# Patient Record
Sex: FEMALE | Race: OTHER | NOT HISPANIC OR LATINO | Employment: FULL TIME | URBAN - METROPOLITAN AREA
[De-identification: names, ages, dates, MRNs, and addresses within clinical notes are randomized per-mention and may not be internally consistent; named-entity substitution may affect disease eponyms.]

---

## 2022-10-28 ENCOUNTER — TELEMEDICINE (OUTPATIENT)
Dept: FAMILY MEDICINE CLINIC | Facility: TELEHEALTH | Age: 42
End: 2022-10-28

## 2022-10-28 DIAGNOSIS — K29.00 ACUTE GASTRITIS WITHOUT HEMORRHAGE, UNSPECIFIED GASTRITIS TYPE: Primary | ICD-10-CM

## 2022-10-28 PROCEDURE — BRIGHTMDVISIT: Performed by: NURSE PRACTITIONER

## 2022-10-28 RX ORDER — SUCRALFATE ORAL 1 G/10ML
1 SUSPENSION ORAL
Qty: 400 ML | Refills: 0 | OUTPATIENT
Start: 2022-10-28 | End: 2022-11-02

## 2022-10-28 RX ORDER — FAMOTIDINE 20 MG/1
20 TABLET, FILM COATED ORAL 2 TIMES DAILY PRN
Qty: 60 TABLET | Refills: 0 | Status: SHIPPED | OUTPATIENT
Start: 2022-10-28 | End: 2022-11-22

## 2022-10-28 NOTE — PROGRESS NOTES
Chief Complaint   Patient presents with   • Nausea       Subjective   Linnea Henry is a 42 y.o. female.     Nausea  The current episode started 1 to 4 weeks ago. The problem occurs intermittently. Associated symptoms include a change in bowel habit and nausea. Pertinent negatives include no abdominal pain, arthralgias, chills, congestion, diaphoresis, fatigue, fever, headaches, myalgias, rash or vomiting. Associated symptoms comments: Bloating, abdominal pressure, gas. The symptoms are aggravated by drinking and eating. She has tried acetaminophen and NSAIDs for the symptoms. The treatment provided no relief.       The following portions of the patient's history were reviewed and updated as appropriate: allergies, current medications, past medical history, and problem list.      Past Medical History:   Diagnosis Date   • Irritable bowel syndrome      Social History     Socioeconomic History   • Marital status:      medication documentation: reviewed and updated with patient and   Current Outpatient Medications:   •  famotidine (Pepcid) 20 MG tablet, Take 1 tablet by mouth 2 (Two) Times a Day As Needed for Indigestion or Heartburn for up to 30 days., Disp: 60 tablet, Rfl: 0  •  sucralfate (Carafate) 1 GM/10ML suspension, Take 10 mL by mouth 4 (Four) Times a Day With Meals & at Bedtime for 10 days., Disp: 400 mL, Rfl: 0  Review of Systems   Constitutional: Negative for appetite change, chills, diaphoresis, fatigue and fever.   HENT: Negative for congestion.    Gastrointestinal: Positive for change in bowel habit and nausea. Negative for abdominal pain, blood in stool, constipation, diarrhea and vomiting.   Endocrine: Negative for polydipsia and polyuria.   Genitourinary: Negative for decreased urine volume, difficulty urinating, dysuria, flank pain, hematuria, menstrual problem, pelvic pain and urgency.   Musculoskeletal: Positive for back pain. Negative for arthralgias and myalgias.   Skin: Negative for  color change, pallor and rash.   Neurological: Negative for dizziness and headaches.       Objective   Physical Exam  Constitutional:       General: She is not in acute distress.     Appearance: She is not ill-appearing or diaphoretic.   HENT:      Nose: Nose normal.      Mouth/Throat:      Pharynx: Oropharynx is clear.   Eyes:      Conjunctiva/sclera: Conjunctivae normal.   Pulmonary:      Effort: Pulmonary effort is normal.   Abdominal:      General: There is no distension.      Tenderness: There is no abdominal tenderness.      Comments: Patient guided exam: reports tenderness in epigastric region   Neurological:      Mental Status: She is alert.         Assessment & Plan   Diagnoses and all orders for this visit:    1. Acute gastritis without hemorrhage, unspecified gastritis type (Primary)  -     sucralfate (Carafate) 1 GM/10ML suspension; Take 10 mL by mouth 4 (Four) Times a Day With Meals & at Bedtime for 10 days.  Dispense: 400 mL; Refill: 0  -     famotidine (Pepcid) 20 MG tablet; Take 1 tablet by mouth 2 (Two) Times a Day As Needed for Indigestion or Heartburn for up to 30 days.  Dispense: 60 tablet; Refill: 0                    Follow Up:  If your symptoms are not resolving by the completion of your treatment or are worsening, see your primary care provider for follow up. If you don't have a primary care provider, you may go to any Urgent Care for re-evaluation. If you develop any life threatening symptoms, go to the nearest Emergency Department immediately or call EMS.               The use of  Video Visit was utilized during this visit, using both MyChart and Zoom. The use of a video visit has been reviewed with the patient and verbal informed consent has been obtained. No technical difficulties occurred during the visit.    is located at [address] [place] [id number]  Provider is located at [place], [place]

## 2022-11-22 ENCOUNTER — LAB (OUTPATIENT)
Dept: LAB | Facility: HOSPITAL | Age: 42
End: 2022-11-22

## 2022-11-22 ENCOUNTER — OFFICE VISIT (OUTPATIENT)
Dept: FAMILY MEDICINE CLINIC | Facility: CLINIC | Age: 42
End: 2022-11-22

## 2022-11-22 VITALS
SYSTOLIC BLOOD PRESSURE: 110 MMHG | HEART RATE: 75 BPM | RESPIRATION RATE: 16 BRPM | TEMPERATURE: 98 F | BODY MASS INDEX: 22.49 KG/M2 | DIASTOLIC BLOOD PRESSURE: 70 MMHG | HEIGHT: 65 IN | OXYGEN SATURATION: 100 % | WEIGHT: 135 LBS

## 2022-11-22 DIAGNOSIS — Z92.89 HISTORY OF ENDOMETRIAL BIOPSY: ICD-10-CM

## 2022-11-22 DIAGNOSIS — E55.9 VITAMIN D DEFICIENCY: ICD-10-CM

## 2022-11-22 DIAGNOSIS — R53.83 OTHER FATIGUE: ICD-10-CM

## 2022-11-22 DIAGNOSIS — Z13.220 SCREENING CHOLESTEROL LEVEL: ICD-10-CM

## 2022-11-22 DIAGNOSIS — N64.4 BREAST PAIN: ICD-10-CM

## 2022-11-22 DIAGNOSIS — R07.81 RIB PAIN: Primary | ICD-10-CM

## 2022-11-22 PROBLEM — K58.9 IRRITABLE BOWEL SYNDROME: Status: ACTIVE | Noted: 2022-11-22

## 2022-11-22 PROBLEM — G47.09 OTHER INSOMNIA: Status: RESOLVED | Noted: 2022-11-22 | Resolved: 2022-11-22

## 2022-11-22 PROBLEM — G47.09 OTHER INSOMNIA: Status: ACTIVE | Noted: 2022-11-22

## 2022-11-22 PROBLEM — H90.6 MIXED CONDUCTIVE AND SENSORINEURAL HEARING LOSS OF BOTH EARS: Status: ACTIVE | Noted: 2022-11-22

## 2022-11-22 LAB
25(OH)D3 SERPL-MCNC: 20.4 NG/ML (ref 30–100)
ALBUMIN SERPL-MCNC: 4.2 G/DL (ref 3.5–5.2)
ALBUMIN/GLOB SERPL: 2.1 G/DL
ALP SERPL-CCNC: 49 U/L (ref 39–117)
ALT SERPL W P-5'-P-CCNC: 14 U/L (ref 1–33)
ANION GAP SERPL CALCULATED.3IONS-SCNC: 13.5 MMOL/L (ref 5–15)
AST SERPL-CCNC: 15 U/L (ref 1–32)
BASOPHILS # BLD AUTO: 0.07 10*3/MM3 (ref 0–0.2)
BASOPHILS NFR BLD AUTO: 0.8 % (ref 0–1.5)
BILIRUB SERPL-MCNC: 0.6 MG/DL (ref 0–1.2)
BUN SERPL-MCNC: 7 MG/DL (ref 6–20)
BUN/CREAT SERPL: 10 (ref 7–25)
CALCIUM SPEC-SCNC: 9.4 MG/DL (ref 8.6–10.5)
CHLORIDE SERPL-SCNC: 102 MMOL/L (ref 98–107)
CHOLEST SERPL-MCNC: 212 MG/DL (ref 0–200)
CO2 SERPL-SCNC: 25.5 MMOL/L (ref 22–29)
CREAT SERPL-MCNC: 0.7 MG/DL (ref 0.57–1)
DEPRECATED RDW RBC AUTO: 43.8 FL (ref 37–54)
EGFRCR SERPLBLD CKD-EPI 2021: 110.9 ML/MIN/1.73
EOSINOPHIL # BLD AUTO: 0.12 10*3/MM3 (ref 0–0.4)
EOSINOPHIL NFR BLD AUTO: 1.4 % (ref 0.3–6.2)
ERYTHROCYTE [DISTWIDTH] IN BLOOD BY AUTOMATED COUNT: 13.3 % (ref 12.3–15.4)
GLOBULIN UR ELPH-MCNC: 2 GM/DL
GLUCOSE SERPL-MCNC: 80 MG/DL (ref 65–99)
HCT VFR BLD AUTO: 46.5 % (ref 34–46.6)
HDLC SERPL-MCNC: 74 MG/DL (ref 40–60)
HGB BLD-MCNC: 14.8 G/DL (ref 12–15.9)
IMM GRANULOCYTES # BLD AUTO: 0.04 10*3/MM3 (ref 0–0.05)
IMM GRANULOCYTES NFR BLD AUTO: 0.5 % (ref 0–0.5)
LDLC SERPL CALC-MCNC: 124 MG/DL (ref 0–100)
LDLC/HDLC SERPL: 1.66 {RATIO}
LYMPHOCYTES # BLD AUTO: 1.58 10*3/MM3 (ref 0.7–3.1)
LYMPHOCYTES NFR BLD AUTO: 18.4 % (ref 19.6–45.3)
MCH RBC QN AUTO: 28.5 PG (ref 26.6–33)
MCHC RBC AUTO-ENTMCNC: 31.8 G/DL (ref 31.5–35.7)
MCV RBC AUTO: 89.4 FL (ref 79–97)
MONOCYTES # BLD AUTO: 0.39 10*3/MM3 (ref 0.1–0.9)
MONOCYTES NFR BLD AUTO: 4.5 % (ref 5–12)
NEUTROPHILS NFR BLD AUTO: 6.4 10*3/MM3 (ref 1.7–7)
NEUTROPHILS NFR BLD AUTO: 74.4 % (ref 42.7–76)
NRBC BLD AUTO-RTO: 0 /100 WBC (ref 0–0.2)
PLATELET # BLD AUTO: 266 10*3/MM3 (ref 140–450)
PMV BLD AUTO: 10.4 FL (ref 6–12)
POTASSIUM SERPL-SCNC: 4.4 MMOL/L (ref 3.5–5.2)
PROT SERPL-MCNC: 6.2 G/DL (ref 6–8.5)
RBC # BLD AUTO: 5.2 10*6/MM3 (ref 3.77–5.28)
SODIUM SERPL-SCNC: 141 MMOL/L (ref 136–145)
TRIGL SERPL-MCNC: 77 MG/DL (ref 0–150)
TSH SERPL DL<=0.05 MIU/L-ACNC: 1.72 UIU/ML (ref 0.27–4.2)
VLDLC SERPL-MCNC: 14 MG/DL (ref 5–40)
WBC NRBC COR # BLD: 8.6 10*3/MM3 (ref 3.4–10.8)

## 2022-11-22 PROCEDURE — 90471 IMMUNIZATION ADMIN: CPT | Performed by: STUDENT IN AN ORGANIZED HEALTH CARE EDUCATION/TRAINING PROGRAM

## 2022-11-22 PROCEDURE — 85025 COMPLETE CBC W/AUTO DIFF WBC: CPT

## 2022-11-22 PROCEDURE — 82306 VITAMIN D 25 HYDROXY: CPT

## 2022-11-22 PROCEDURE — 80061 LIPID PANEL: CPT

## 2022-11-22 PROCEDURE — 90686 IIV4 VACC NO PRSV 0.5 ML IM: CPT | Performed by: STUDENT IN AN ORGANIZED HEALTH CARE EDUCATION/TRAINING PROGRAM

## 2022-11-22 PROCEDURE — 80053 COMPREHEN METABOLIC PANEL: CPT

## 2022-11-22 PROCEDURE — 84443 ASSAY THYROID STIM HORMONE: CPT

## 2022-11-22 PROCEDURE — 99204 OFFICE O/P NEW MOD 45 MIN: CPT | Performed by: STUDENT IN AN ORGANIZED HEALTH CARE EDUCATION/TRAINING PROGRAM

## 2022-11-22 RX ORDER — NAPROXEN 500 MG/1
500 TABLET ORAL 2 TIMES DAILY WITH MEALS
Qty: 20 TABLET | Refills: 0 | Status: SHIPPED | OUTPATIENT
Start: 2022-11-22 | End: 2022-11-29

## 2022-11-22 RX ORDER — METHOCARBAMOL 750 MG/1
750 TABLET, FILM COATED ORAL 3 TIMES DAILY PRN
Qty: 30 TABLET | Refills: 1 | Status: SHIPPED | OUTPATIENT
Start: 2022-11-22 | End: 2022-11-29

## 2022-11-22 NOTE — PATIENT INSTRUCTIONS
Naproxen  Robaxin for muscle relaxor     [email or web address]    [facility] lab   [address], [place] [address] [phone]    [facility] lab   [address] [place] [id number] [phone]

## 2022-11-22 NOTE — PROGRESS NOTES
New Patient Office Visit      Subjective      Chief Complaint:  Headache, Fatigue, and Breast Pain    History of Present Illness: Linnea Durant is a 42 y.o. female who presents for a new patient visit.     CBC, CMP and TSH within normal as 2019.  Vitamin D a little bit low in 2019.     Patient with breast pain for 1 week. It is bilateral. Improved with sports bra. No changes to the breast. No family history breast cancer. No new palpable changes to breast. No vaginal bleeding and s/p hysterectomy.     Stated getting spasm to middle of back 2 days ago. This is still bothersome. No injury.     Patient does have some fatigue as well and trouble sleeping due to stress    Past Medical History:   Diagnosis Date   • Irritable bowel syndrome        Past Surgical History:   Procedure Laterality Date   • ADENOIDECTOMY     • APPENDECTOMY     • HYSTERECTOMY      endometriosis, and precancerous cells in uterus.       Family History   Problem Relation Age of Onset   • Hypertension Mother    • Anxiety disorder Mother    • Depression Mother    • Diabetes Mother        Social History     Socioeconomic History   • Marital status:    Tobacco Use   • Smoking status: Former     Packs/day: 0.50     Years: 30.00     Pack years: 15.00     Types: Cigarettes     Start date:      Quit date: 2017     Years since quittin.8     Passive exposure: Past   • Smokeless tobacco: Never   Vaping Use   • Vaping Use: Never used   Substance and Sexual Activity   • Alcohol use: Yes     Comment: 1 per week   • Drug use: Never   • Sexual activity: Yes     Partners: Male         Current Outpatient Medications:   •  methocarbamol (ROBAXIN) 750 MG tablet, Take 1 tablet by mouth 3 (Three) Times a Day As Needed for Muscle Spasms., Disp: 30 tablet, Rfl: 1  •  naproxen (Naprosyn) 500 MG tablet, Take 1 tablet by mouth 2 (Two) Times a Day With Meals for 10 days., Disp: 20 tablet, Rfl: 0    Allergies   Allergen Reactions   • Morphine Hives  "      Objective     Physical Exam:  Vital Signs:  /70   Pulse 75   Temp 98 °F (36.7 °C)   Resp 16   Ht 165.1 cm (65\")   Wt 61.2 kg (135 lb)   SpO2 100%   BMI 22.47 kg/m²      Physical Exam  Constitutional:       General: She is not in acute distress.     Appearance: She is not ill-appearing.   Eyes:      Extraocular Movements: Extraocular movements intact.   Neck:      Thyroid: No thyromegaly.   Cardiovascular:      Rate and Rhythm: Normal rate and regular rhythm.      Heart sounds: No murmur heard.   Pulmonary:      Effort: Pulmonary effort is normal.      Breath sounds: Normal breath sounds.   Abdominal:      Palpations: Abdomen is soft.   Lymphadenopathy:      Cervical: No cervical adenopathy.   Skin:     General: Skin is warm and dry.   Neurological:      Mental Status: She is alert.   Psychiatric:         Thought Content: Thought content normal.   Breast: Left: no rash.  No changes to nipple areola.  No mass palpated.  No axillary adenopathy tenderness throughout the breast.  Tenderness underneath the breast to the palpation of ribs  Right: no rash.  No changes to nipple areola.  No mass palpated.  No axillary adenopathy    Patient has tenderness to the right thoracic paraspinal and along the right ribs.  Tenderness progresses all the way to right anterior ribs to costochondral junction           Assessment / Plan      Assessment/Plan:   Diagnoses and all orders for this visit:    1. Rib pain (Primary)  -     naproxen (Naprosyn) 500 MG tablet; Take 1 tablet by mouth 2 (Two) Times a Day With Meals for 10 days.  Dispense: 20 tablet; Refill: 0  -     methocarbamol (ROBAXIN) 750 MG tablet; Take 1 tablet by mouth 3 (Three) Times a Day As Needed for Muscle Spasms.  Dispense: 30 tablet; Refill: 1  -     Rib/breast pain seems most likely to be musculoskeletal primarily in nature and was given stretches and exercises for rib dysfunction and treat with naproxen and Robaxin.  Follow-up if not improving.    2. " Breast pain  -     Cancel: Ambulatory Referral to Gynecology  -     Ambulatory Referral to Gynecology  -     While most of this pain seems related to musculoskeletal origin she does have a little bit of tenderness to the breast bilaterally.  No imaging needed.  She needs to establish with GYN and can further address at that appointment if not improved.  I recommended supportive bra and NSAIDs.    3. History of endometrial biopsy  -     Cancel: Ambulatory Referral to Gynecology  -     Ambulatory Referral to Gynecology  -     History of precancerous cells on endometrial biopsy and had hysterectomy for endometriosis and abnormal endometrial biopsy.  Had this done in [place] but not sure where I am asked her to try to find these records.    4. Other fatigue  -     Comprehensive Metabolic Panel  -     CBC & Differential  -     TSH Rfx On Abnormal To Free T4  -     Likely due to stress and poor sleep.  Check labs to rule out alternative causes    5. Vitamin D deficiency  -     Vitamin D,25-Hydroxy    6. Screening cholesterol level  -     Lipid Panel    Other orders  -     FluLaval/Fluzone >6 mos ([id number])    Follow Up:   No follow-ups on file.    MDM: Fatigue is new problem uncertain prognosis to examiner requiring further evaluation labs.  Prescription drug management    Raoul Mccabe MD  [place] Medicine - [facility] [place]

## 2022-11-29 ENCOUNTER — OFFICE VISIT (OUTPATIENT)
Dept: OBSTETRICS AND GYNECOLOGY | Facility: CLINIC | Age: 42
End: 2022-11-29

## 2022-11-29 VITALS
RESPIRATION RATE: 16 BRPM | SYSTOLIC BLOOD PRESSURE: 116 MMHG | WEIGHT: 136 LBS | BODY MASS INDEX: 22.63 KG/M2 | DIASTOLIC BLOOD PRESSURE: 70 MMHG

## 2022-11-29 DIAGNOSIS — N60.12 FIBROCYSTIC BREAST CHANGES, BILATERAL: ICD-10-CM

## 2022-11-29 DIAGNOSIS — N60.11 FIBROCYSTIC BREAST CHANGES, BILATERAL: ICD-10-CM

## 2022-11-29 DIAGNOSIS — E55.9 VITAMIN D DEFICIENCY: Primary | ICD-10-CM

## 2022-11-29 DIAGNOSIS — Z80.41 FAMILY HISTORY OF OVARIAN CANCER: ICD-10-CM

## 2022-11-29 DIAGNOSIS — Z01.419 ENCOUNTER FOR GYNECOLOGICAL EXAMINATION WITHOUT ABNORMAL FINDING: Primary | ICD-10-CM

## 2022-11-29 DIAGNOSIS — N64.4 PAIN OF BOTH BREASTS: ICD-10-CM

## 2022-11-29 PROCEDURE — 3008F BODY MASS INDEX DOCD: CPT | Performed by: NURSE PRACTITIONER

## 2022-11-29 PROCEDURE — 2014F MENTAL STATUS ASSESS: CPT | Performed by: NURSE PRACTITIONER

## 2022-11-29 PROCEDURE — 99396 PREV VISIT EST AGE 40-64: CPT | Performed by: NURSE PRACTITIONER

## 2022-11-29 RX ORDER — ERGOCALCIFEROL 1.25 MG/1
50000 CAPSULE ORAL WEEKLY
Qty: 13 CAPSULE | Refills: 3 | Status: SHIPPED | OUTPATIENT
Start: 2022-11-29 | End: 2022-11-29

## 2022-11-29 NOTE — PROGRESS NOTES
Annual Visit     Patient Name: Linnea Durant  : 1980   MRN: [id number]   Care Team: Patient Care Team:  Raoul Mccabe MD as PCP - General (Family Medicine)  Opal Lundberg APRN as Nurse Practitioner (Nurse Practitioner)    Chief Complaint:    Chief Complaint   Patient presents with   • Annual Exam   • Breast Pain       HPI: Linnea Durant is a 42 y.o. year old  presenting to be seen for her gynecologic exam - new pt.   S/p total hyst with RSO d/t endometriosis   Left ovary in situ     C/o bilateral breast pain x 1 wk now, but worse in right breast   Wearing a sports bra is helpful   Hasn't had breast imaging yet   Drinks minimal amt of caffeine   No changes on SBEs - no masses noted, no nipple d/c, or skin changes   No family hx of breast cancer     Family hx of ovarian cancer - MGM   Has never had genetic testing       Subjective      I have reviewed the patients family history, social history, past medical history, past surgical history and have updated it as appropriate.    /70   Resp 16   Wt 61.7 kg (136 lb)   BMI 22.63 kg/m²     BMI reviewed: Body mass index is 22.63 kg/m².      Objective     Physical Exam    Neuro: alert and oriented to person, place and time   General:  alert; cooperative; well developed; well nourished   Skin:  No suspicious lesions seen   Thyroid: normal to inspection and palpation   Lungs:  breathing is unlabored  clear to auscultation bilaterally   Heart:  regular rate and rhythm, S1, S2 normal, no murmur, click, rub or gallop  normal apical impulse   Breasts:  Examined in supine position  Symmetric without masses or skin dimpling  Nipples normal without inversion, lesions or discharge  There are no palpable axillary nodes  Fibrocystic changes are present both breasts without a discrete mass   Abdomen: soft, non-tender; no masses  no umbilical or inguinal hernias are present  no hepato-splenomegaly   Pelvis: Clinical staff was present for  exam  External genitalia:  normal appearance of the external genitalia including Bartholin's and Bunker's glands.  :  urethral meatus normal;  Vaginal:  normal pink mucosa without prolapse or lesions.  Cervix:  absent.  Uterus:  absent.  Adnexa:  LEFT adnexa normal; RIGHT adnexa absent  Rectal:  digital rectal exam not performed; anus visually normal appearing.         Assessment / Plan      Assessment  Problems Addressed This Visit    ICD-10-CM ICD-9-CM   1. Encounter for gynecological examination without abnormal finding  Z01.419 V72.31   2. Fibrocystic breast changes, bilateral  N60.11 610.1    N60.12    3. Pain of both breasts  N64.4 611.71   4. Family history of ovarian cancer  Z80.41 V16.41       Plan    Discussed cessation of pap smear screening after hysterectomy done for benign reasons     Discussed monthly SBEs and importance of annual breast imaging   Discussed fibrocystic breast changes and breast pain   Recommend vit e or evening primrose oil and NSAIDs or tylenol prn   Cont wearing supportive bra   Bilateral dx mammogram ordered - will call to discuss results and recommended f/u     Discussed ovarian screening program at  - contact info given   Discussed genetic testing/counseling - she declines today     AV 1 yr         40 to 64: Counseling/Anticipatory Guidance Discussed: screenings and self-breast exam    Follow Up  Return in about 1 year (around 11/29/2023) for Annual physical.  Patient was given instructions and counseling regarding her condition or for health maintenance advice. Please see specific information pulled into the AVS if appropriate.     Opal Lundberg, APRN  November 29, 2022  11:30 EST

## 2023-03-06 ENCOUNTER — HOSPITAL ENCOUNTER (OUTPATIENT)
Dept: MAMMOGRAPHY | Facility: HOSPITAL | Age: 43
Discharge: HOME | End: 2023-03-06
Admitting: NURSE PRACTITIONER
Payer: MEDICAID

## 2023-03-06 DIAGNOSIS — N64.4 PAIN OF BOTH BREASTS: ICD-10-CM

## 2023-03-06 PROCEDURE — 77066 DX MAMMO INCL CAD BI: CPT

## 2023-03-06 PROCEDURE — G0279 TOMOSYNTHESIS, MAMMO: HCPCS

## 2023-03-06 PROCEDURE — 77066 DX MAMMO INCL CAD BI: CPT | Performed by: RADIOLOGY

## 2023-03-06 PROCEDURE — 77062 BREAST TOMOSYNTHESIS BI: CPT | Performed by: RADIOLOGY

## 2023-03-09 DIAGNOSIS — Z80.3 FAMILY HISTORY OF BREAST CANCER: Primary | ICD-10-CM

## 2023-03-09 DIAGNOSIS — Z80.41 FAMILY HISTORY OF OVARIAN CANCER: ICD-10-CM

## 2023-04-03 ENCOUNTER — OFFICE VISIT (OUTPATIENT)
Dept: FAMILY MEDICINE CLINIC | Facility: CLINIC | Age: 43
End: 2023-04-03
Payer: MEDICAID

## 2023-04-03 VITALS
OXYGEN SATURATION: 100 % | HEART RATE: 78 BPM | SYSTOLIC BLOOD PRESSURE: 134 MMHG | WEIGHT: 135 LBS | TEMPERATURE: 98.2 F | DIASTOLIC BLOOD PRESSURE: 84 MMHG | RESPIRATION RATE: 18 BRPM | HEIGHT: 65 IN | BODY MASS INDEX: 22.49 KG/M2

## 2023-04-03 DIAGNOSIS — L30.9 HAND ECZEMA: ICD-10-CM

## 2023-04-03 DIAGNOSIS — G56.21 ULNAR NEUROPATHY OF RIGHT UPPER EXTREMITY: Primary | ICD-10-CM

## 2023-04-03 RX ORDER — MOMETASONE FUROATE 1 MG/G
1 OINTMENT TOPICAL DAILY
Qty: 45 G | Refills: 0 | Status: SHIPPED | OUTPATIENT
Start: 2023-04-03

## 2023-04-03 RX ORDER — MULTIVITAMIN
TABLET ORAL
COMMUNITY
Start: 2023-01-20

## 2023-04-03 RX ORDER — ERGOCALCIFEROL 1.25 MG/1
CAPSULE ORAL
COMMUNITY
Start: 2023-03-14

## 2023-04-03 RX ORDER — METHOCARBAMOL 750 MG/1
TABLET, FILM COATED ORAL
COMMUNITY
Start: 2022-12-27

## 2023-04-03 RX ORDER — ACETAMINOPHEN 500 MG
500 TABLET ORAL EVERY 6 HOURS PRN
COMMUNITY

## 2023-04-03 NOTE — PROGRESS NOTES
"  Established Patient Office Visit        Subjective      Chief Complaint:  Numbness (Numbness in left hand, peeling and cracking of skin on both hands, does want tdap today)      History of Present Illness: Linnea Durant is a 42 y.o. female who presents for hand dryness and numbness     Worsening dryness and cracking to the fingers.  She works as a  at [employer].    numbess to the left 4th and 5th fingers. Started 2 weeks ago.      Patient Active Problem List   Diagnosis   • Mixed conductive and sensorineural hearing loss of both ears   • Irritable bowel syndrome   • Vitamin D deficiency   • History of endometrial biopsy         Current Outpatient Medications:   •  acetaminophen (TYLENOL) 500 MG tablet, Take 1 tablet by mouth Every 6 (Six) Hours As Needed for Mild Pain. Patient takes two 500 mg tablets otc as needed for migraines, Disp: , Rfl:   •  methocarbamol (ROBAXIN) 750 MG tablet, , Disp: , Rfl:   •  vitamin D (ERGOCALCIFEROL) 1.25 MG (41909 UT) capsule capsule, , Disp: , Rfl:   •  mometasone (ELOCON) 0.1 % ointment, Apply 1 application topically to the appropriate area as directed Daily., Disp: 45 g, Rfl: 0  •  One-Daily Multi-Vitamin tablet tablet, , Disp: , Rfl:        Objective     Physical Exam:   Vital Signs:   /84 (BP Location: Left arm, Patient Position: Sitting, Cuff Size: Adult)   Pulse 78   Temp 98.2 °F (36.8 °C) (Temporal)   Resp 18   Ht 165.1 cm (65\")   Wt 61.2 kg (135 lb)   SpO2 100%   BMI 22.47 kg/m²      Physical Exam  Constitutional:       General: She is not in acute distress.     Appearance: She is not ill-appearing.        Thought Content: Thought content normal.   Dry skin to fingers of the left hand   Left hand with + tinels and medial nerve compression  Neg tinels and cubital tunnel.   -spurlings            Assessment / Plan      Assessment/Plan:   Diagnoses and all orders for this visit:    1. Ulnar neuropathy of right upper extremity (Primary)  -likely " worsening by occupational overuse  -wrist brace with work and sleep  -elbow brace at night.   - if not improved in 6 weeks get EMG and refer to ortho.     2. Hand eczema  -     mometasone (ELOCON) 0.1 % ointment; Apply 1 application topically to the appropriate area as directed Daily.  Dispense: 45 g; Refill: 0  -Avoid chemical irritants.  Mornings throughout the day.  Mometasone once daily for 2 weeks. Avoid latex which she is doing.  Can increase potency of steroid if not responding.         Follow Up:   Return in about 7 months (around 11/3/2023) for Follow-up.      MDM:     Raoul Mccabe MD  [place] [facility] [place]

## 2023-04-07 ENCOUNTER — TELEPHONE (OUTPATIENT)
Dept: FAMILY MEDICINE CLINIC | Facility: CLINIC | Age: 43
End: 2023-04-07

## 2023-04-07 NOTE — TELEPHONE ENCOUNTER
Caller: [facility] [place], [place] - [address] [phone]  - [phone] FX    Relationship: Pharmacy    Best call back number: [phone]    What medications are you currently taking:   Current Outpatient Medications on File Prior to Visit   Medication Sig Dispense Refill   • acetaminophen (TYLENOL) 500 MG tablet Take 1 tablet by mouth Every 6 (Six) Hours As Needed for Mild Pain. Patient takes two 500 mg tablets otc as needed for migraines     • methocarbamol (ROBAXIN) 750 MG tablet      • mometasone (ELOCON) 0.1 % ointment Apply 1 application topically to the appropriate area as directed Daily. 45 g 0   • One-Daily Multi-Vitamin tablet tablet  (Patient not taking: Reported on 4/3/2023)     • vitamin D (ERGOCALCIFEROL) 1.25 MG (68150 UT) capsule capsule        No current facility-administered medications on file prior to visit.      [facility] [place] [address] [phone]  - [phone] FX  [phone]    Which medication are you concerned about: mometasone (ELOCON) 0.1 % ointment    Who prescribed you this medication: JOSE LORA MD    What are your concerns: THE OINTMENT IS ON BACK ORDER AND THE PHARMACY WANTS TO KNOW IF THEY CAN SUBSTITUTE THIS FOR THE CREAM WHICH IS IN STOCK

## 2023-04-12 ENCOUNTER — TELEPHONE (OUTPATIENT)
Dept: FAMILY MEDICINE CLINIC | Facility: CLINIC | Age: 43
End: 2023-04-12

## 2023-04-12 NOTE — TELEPHONE ENCOUNTER
Pharmacy Name:  [facility]    Pharmacy representative name: VERNON    Pharmacy representative phone number: [phone]    What medication are you calling in regards to:   mometasone (ELOCON) 0.1 % ointment    What question does the pharmacy have: DOES NOT HAVE THE OINTMENT IN STOCK BUT HAS THE CREAM; CAN THIS BE CHANGED    Who is the provider that prescribed the medication: DR LORA    Additional notes:

## 2024-03-25 NOTE — TELEPHONE ENCOUNTER
Rx Refill Note  Requested Prescriptions     Pending Prescriptions Disp Refills    vitamin D (ERGOCALCIFEROL) 1.25 MG (71588 UT) capsule capsule [Pharmacy Med Name: Vitamin D2 1,250 mcg (50,000 unit) capsule] 13 capsule 3     Sig: TAKE 1 CAPSULE BY MOUTH 1 (ONE) TIME PER WEEK.      Last office visit with prescribing clinician: 4/3/2023   Last telemedicine visit with prescribing clinician: Visit date not found   Next office visit with prescribing clinician: Visit date not found                         Would you like a call back once the refill request has been completed: [] Yes [] No    If the office needs to give you a call back, can they leave a voicemail: [] Yes [] No    Ita Perez LPN  03/25/24, 16:35 EDT

## 2024-03-26 RX ORDER — ERGOCALCIFEROL 1.25 MG/1
50000 CAPSULE ORAL WEEKLY
Qty: 13 CAPSULE | Refills: 3 | Status: SHIPPED | OUTPATIENT
Start: 2024-03-26

## 2024-05-20 ENCOUNTER — OFFICE VISIT (OUTPATIENT)
Dept: FAMILY MEDICINE CLINIC | Facility: CLINIC | Age: 44
End: 2024-05-20
Payer: MEDICAID

## 2024-05-20 VITALS
HEART RATE: 80 BPM | OXYGEN SATURATION: 99 % | HEIGHT: 64 IN | BODY MASS INDEX: 20.79 KG/M2 | DIASTOLIC BLOOD PRESSURE: 88 MMHG | WEIGHT: 121.8 LBS | RESPIRATION RATE: 18 BRPM | SYSTOLIC BLOOD PRESSURE: 138 MMHG | TEMPERATURE: 98.9 F

## 2024-05-20 DIAGNOSIS — R05.2 SUBACUTE COUGH: ICD-10-CM

## 2024-05-20 DIAGNOSIS — J01.10 SUBACUTE FRONTAL SINUSITIS: ICD-10-CM

## 2024-05-20 PROCEDURE — 99214 OFFICE O/P EST MOD 30 MIN: CPT | Performed by: STUDENT IN AN ORGANIZED HEALTH CARE EDUCATION/TRAINING PROGRAM

## 2024-05-20 PROCEDURE — 1125F AMNT PAIN NOTED PAIN PRSNT: CPT | Performed by: STUDENT IN AN ORGANIZED HEALTH CARE EDUCATION/TRAINING PROGRAM

## 2024-05-20 RX ORDER — AMOXICILLIN AND CLAVULANATE POTASSIUM 875; 125 MG/1; MG/1
1 TABLET, FILM COATED ORAL 2 TIMES DAILY
Qty: 20 TABLET | Refills: 0 | Status: SHIPPED | OUTPATIENT
Start: 2024-05-20 | End: 2024-05-30

## 2024-05-20 RX ORDER — FLUCONAZOLE 150 MG/1
150 TABLET ORAL
Qty: 2 TABLET | Refills: 0 | Status: SHIPPED | OUTPATIENT
Start: 2024-05-20

## 2024-05-20 RX ORDER — BROMPHENIRAMINE MALEATE, PSEUDOEPHEDRINE HYDROCHLORIDE, AND DEXTROMETHORPHAN HYDROBROMIDE 2; 30; 10 MG/5ML; MG/5ML; MG/5ML
SYRUP ORAL 4 TIMES DAILY PRN
COMMUNITY

## 2024-05-20 RX ORDER — DIPHENHYDRAMINE HCL 25 MG
25 CAPSULE ORAL EVERY 6 HOURS PRN
COMMUNITY

## 2024-05-20 RX ORDER — LACTOBACILLUS RHAMNOSUS GG 10B CELL
CAPSULE ORAL
COMMUNITY
Start: 2024-03-22

## 2024-05-20 RX ORDER — DEXTROMETHORPHAN HYDROBROMIDE AND PROMETHAZINE HYDROCHLORIDE 15; 6.25 MG/5ML; MG/5ML
5 SYRUP ORAL 4 TIMES DAILY PRN
Qty: 118 ML | Refills: 0 | Status: SHIPPED | OUTPATIENT
Start: 2024-05-20

## 2024-05-20 RX ORDER — IBUPROFEN 200 MG
200 TABLET ORAL EVERY 6 HOURS PRN
COMMUNITY

## 2024-05-20 NOTE — PROGRESS NOTES
Established Patient Office Visit        Subjective      Chief Complaint:  Cough (Cough, went to urgent care March 22nd for this, to abx went away for a week and came back and this has been coming and going ever since. Having migraines with this, runny nose as well. Patient said that heartrate dropped to 50  after two cups of coffee the other day. Chest tightness and shortness of breath)      History of Present Illness: Linnea Durant is a 43 y.o. female who presents for 2 months of cough for 2 months. Runny nose congestion and runny nose. Started around 3/20 with URI like symtpoms went to urgent care. Treated with amoxil and methyl pred. It has been somewhat persistant since then.     I reviewed urgent care note from 3/22, negative strep test, negative COVID-negative flu  Patient Active Problem List   Diagnosis    Mixed conductive and sensorineural hearing loss of both ears    Irritable bowel syndrome    Vitamin D deficiency    History of endometrial biopsy         Current Outpatient Medications:     acetaminophen (TYLENOL) 500 MG tablet, Take 1 tablet by mouth Every 6 (Six) Hours As Needed for Mild Pain. Patient takes two 500 mg tablets otc as needed for migraines, Disp: , Rfl:     brompheniramine-pseudoephedrine-DM (Bromfed DM) 30-2-10 MG/5ML syrup, Take  by mouth 4 (Four) Times a Day As Needed for Allergies. As needed from urgent care, Disp: , Rfl:     diphenhydrAMINE (BENADRYL) 25 mg capsule, Take 1 capsule by mouth Every 6 (Six) Hours As Needed for Itching. otc as needed, Disp: , Rfl:     ibuprofen (ADVIL,MOTRIN) 200 MG tablet, Take 1 tablet by mouth Every 6 (Six) Hours As Needed for Mild Pain. As needed otc, Disp: , Rfl:     mometasone (ELOCON) 0.1 % ointment, Apply 1 application topically to the appropriate area as directed Daily., Disp: 45 g, Rfl: 0    vitamin D (ERGOCALCIFEROL) 1.25 MG (14828 UT) capsule capsule, TAKE 1 CAPSULE BY MOUTH 1 (ONE) TIME PER WEEK., Disp: 13 capsule, Rfl: 3     "amoxicillin-clavulanate (AUGMENTIN) 875-125 MG per tablet, Take 1 tablet by mouth 2 (Two) Times a Day for 10 days., Disp: 20 tablet, Rfl: 0    fluconazole (DIFLUCAN) 150 MG tablet, Take 1 tablet by mouth Every 72 (Seventy-Two) Hours., Disp: 2 tablet, Rfl: 0    Lactobacillus-Inulin (Culturelle Digestive Health) capsule, , Disp: , Rfl:     methylPREDNISolone (MEDROL) 4 MG dose pack, Take as directed on package instructions., Disp: 21 tablet, Rfl: 0    promethazine-dextromethorphan (PROMETHAZINE-DM) 6.25-15 MG/5ML syrup, Take 5 mL by mouth 4 (Four) Times a Day As Needed for Cough., Disp: 118 mL, Rfl: 0       Objective     Physical Exam:   Vital Signs:   /88 (BP Location: Right arm, Patient Position: Sitting, Cuff Size: Adult)   Pulse 80   Temp 98.9 °F (37.2 °C) (Temporal)   Resp 18   Ht 162.6 cm (64\")   Wt 55.2 kg (121 lb 12.8 oz)   SpO2 99%   BMI 20.91 kg/m²      Physical Exam  Constitutional:       General: She is not in acute distress.     Appearance: She is not ill-appearing.   Cardiovascular:      Rate and Rhythm: Normal rate and regular rhythm.   Pulmonary:      Effort: Pulmonary effort is normal.      Breath sounds: Normal breath sounds.   Neurological:      Mental Status: She is alert.   Psychiatric:         Thought Content: Thought content normal.   Tenderness to the frontal and maxillary sinuses   Oropharynx no tonsillar hypertrophy  No cervical adenopathy         Assessment / Plan      Assessment/Plan:   Diagnoses and all orders for this visit:    1. Subacute cough  -     XR Chest 2 View; Future  -     promethazine-dextromethorphan (PROMETHAZINE-DM) 6.25-15 MG/5ML syrup; Take 5 mL by mouth 4 (Four) Times a Day As Needed for Cough.  Dispense: 118 mL; Refill: 0    2. Subacute frontal sinusitis  -     amoxicillin-clavulanate (AUGMENTIN) 875-125 MG per tablet; Take 1 tablet by mouth 2 (Two) Times a Day for 10 days.  Dispense: 20 tablet; Refill: 0    Other orders  -     fluconazole (DIFLUCAN) 150 MG " tablet; Take 1 tablet by mouth Every 72 (Seventy-Two) Hours.  Dispense: 2 tablet; Refill: 0       Seems to have subacute frontal sinusitis incompletely treated with amoxicillin.  Treated with Augmentin for 10 days.  Follow-up for lack of improvement or resolution    Follow Up:   Return if symptoms worsen or fail to improve.    MDM: Data review per HPI 3 points or greater, prescription drugs    Raoul Mccabe MD  Family Medicine - [facility]

## 2024-05-26 DIAGNOSIS — R05.2 SUBACUTE COUGH: ICD-10-CM

## 2024-05-28 RX ORDER — DEXTROMETHORPHAN HYDROBROMIDE AND PROMETHAZINE HYDROCHLORIDE 15; 6.25 MG/5ML; MG/5ML
SYRUP ORAL
Qty: 118 ML | Refills: 0 | Status: SHIPPED | OUTPATIENT
Start: 2024-05-28